# Patient Record
Sex: FEMALE | Race: WHITE | NOT HISPANIC OR LATINO | URBAN - METROPOLITAN AREA
[De-identification: names, ages, dates, MRNs, and addresses within clinical notes are randomized per-mention and may not be internally consistent; named-entity substitution may affect disease eponyms.]

---

## 2017-09-19 ENCOUNTER — OFFICE (OUTPATIENT)
Dept: URBAN - METROPOLITAN AREA CLINIC 45 | Facility: CLINIC | Age: 77
End: 2017-09-19

## 2017-09-19 VITALS
SYSTOLIC BLOOD PRESSURE: 141 MMHG | HEIGHT: 62 IN | TEMPERATURE: 97 F | WEIGHT: 136 LBS | DIASTOLIC BLOOD PRESSURE: 84 MMHG | HEART RATE: 65 BPM

## 2017-09-19 DIAGNOSIS — K21.9 GASTROESOPHAGEAL REFLUX DISEASE: ICD-10-CM

## 2017-09-19 DIAGNOSIS — K51.50 LEFT SIDED COLITIS W/O COMPLICATIONS: ICD-10-CM

## 2017-09-19 DIAGNOSIS — Z79.01 LONG-TERM (CURRENT) USE OF ANTICOAGULANTS: ICD-10-CM

## 2017-09-19 DIAGNOSIS — M35.3 POLYMYALGIA RHEUMATICA: ICD-10-CM

## 2017-09-19 PROCEDURE — 99214 OFFICE O/P EST MOD 30 MIN: CPT

## 2017-09-19 NOTE — SERVICEHPINOTES
SEGUN WELCH returns today. This flare up of colitis started 29 years > The patient is having 1 - 3 bowel movements per day. The patient is has not been having severe cramps and not and frequent loose bloody stools. Body temperature has been has not been greater than 37.5 degrees C.The patient has an established diagnosis of ulcerative proctosigmoiditis. Diagnosis was made greater than 15 years ago. Disease course has been stable on aminosalicylates, 6-MP, prednisone. Current treatment regimen consists of Lialda. Symptoms are remained in remission compared to when the patient was last seen for follow-up. Active GI symptoms include loose stool. They are considered by the patient to be minimal in nature. The patient also reports the following potential extraintestinal symptom manifestations: joint pain. The patient has been doing well overall. currently taking 2 mg of prednisone and, 25 mg of mercaptopurine and 3 Lialda daily and remaining in complete remission except for joint pains and some muscle weakness. Would like to take anti-inflammatory medications for the arthritis but she was made aware of possible risk exacerbation of her IBD. Feels puffy and swollen and blood pressure has been elevated. Has ringing in her ears. Takes Lialda 3 /d and 25 mg of 6-MP. Stopped taking the Lialda due to the excessive cost . Also weaned herself off the prednisone and now has severe weakness of the muscles particularly  and find it hard to stand and raise her arms. Recent blood work shows mild anemia and elevated sedimentation rate suggesting the possibility of polymyalgia. Denies any colitis symptoms at the present time . Ground glass infiltrates being followed up at [facility]. Currently on 8mg prednisone, lowered from 10mg. Worse GERD uses Prevacid PRN. Lialda 2 BID   . Resumed Lialda on 9/1/17. Constipation resolved.

## 2017-12-26 ENCOUNTER — OFFICE (OUTPATIENT)
Dept: URBAN - METROPOLITAN AREA CLINIC 45 | Facility: CLINIC | Age: 77
End: 2017-12-26

## 2017-12-26 VITALS
WEIGHT: 130 LBS | HEIGHT: 62 IN | TEMPERATURE: 97.6 F | DIASTOLIC BLOOD PRESSURE: 62 MMHG | SYSTOLIC BLOOD PRESSURE: 142 MMHG | HEART RATE: 59 BPM

## 2017-12-26 DIAGNOSIS — K51.50 LEFT SIDED COLITIS W/O COMPLICATIONS: ICD-10-CM

## 2017-12-26 DIAGNOSIS — K21.9 GASTROESOPHAGEAL REFLUX DISEASE: ICD-10-CM

## 2017-12-26 DIAGNOSIS — Z79.01 LONG-TERM (CURRENT) USE OF ANTICOAGULANTS: ICD-10-CM

## 2017-12-26 DIAGNOSIS — I25.10 CORONARY ARTERY DISEASE: ICD-10-CM

## 2017-12-26 PROCEDURE — 99214 OFFICE O/P EST MOD 30 MIN: CPT | Performed by: INTERNAL MEDICINE

## 2017-12-26 RX ORDER — PANTOPRAZOLE SODIUM 20 MG/1
TABLET, DELAYED RELEASE ORAL
Qty: 30 | Status: COMPLETED
Start: 2017-12-26 | End: 2019-04-11

## 2017-12-26 NOTE — SERVICEHPINOTES
Recent constipation has resolved.  We have followed the patient for   left-sided ulcerative colitis  .  Disease course has been   stable on current medication  .     Currently on treatment with   6-mercaptopurine, Lialda and Prednisone  .    Baseline IBD symptoms have   improved   since last visit.  The patient   has   been doing well overall.   Currently having   1-2   bowel movement(s) per day.    Active GI symptoms include   occ mucus  .    They are considered by the patient to be   mild   in nature.    Alarm symptoms reported:   none  .    The patient also reports the following potential extraintestinal symptom manifestations:   joint pain/muscle pain  .    Symptoms have recently caused the patient to   .   SP stent placement x2 past year now on Plavix.Follow-up of GERD was discussed.   The patient has typically complained of   heartburn  .      Treatment has consisted of   Prevacid OTC  taken at   once daily  .   This therapy has been associated with   good   relief.   The patient   has not   been having breakthru GERD symptoms.  Symptoms do   not awaken the patient from sleep  .    Has been treating residual symptoms with   .   Continuing symptoms may be brought on by   nothing specific  .    No dysphagia,n/v.

## 2018-03-12 ENCOUNTER — HOSPITAL ENCOUNTER (OUTPATIENT)
Dept: HOSPITAL 54 - RAD | Age: 78
Discharge: HOME | End: 2018-03-12
Payer: MEDICARE

## 2018-03-12 DIAGNOSIS — K44.9: Primary | ICD-10-CM

## 2019-04-11 ENCOUNTER — OFFICE (OUTPATIENT)
Dept: URBAN - METROPOLITAN AREA CLINIC 66 | Facility: CLINIC | Age: 79
End: 2019-04-11

## 2019-04-11 VITALS — DIASTOLIC BLOOD PRESSURE: 74 MMHG | WEIGHT: 140 LBS | HEIGHT: 62 IN | SYSTOLIC BLOOD PRESSURE: 122 MMHG

## 2019-04-11 DIAGNOSIS — K21.9 GASTROESOPHAGEAL REFLUX DISEASE: ICD-10-CM

## 2019-04-11 DIAGNOSIS — K51.90 ULCERATIVE COLITIS: ICD-10-CM

## 2019-04-11 PROCEDURE — 99244 OFF/OP CNSLTJ NEW/EST MOD 40: CPT | Performed by: INTERNAL MEDICINE

## 2019-04-11 NOTE — SERVICEHPINOTES
The patient is a pleasant 80 yo female referred for evaluation of GERD and ulcerative colitis.  The patient has been on Nexium for quite some time, but has been on this after dinner, rather than before.  She'll make the change, and we'll see if this improves things.  Her 2015 EGD found a moderate sized HH, no Hp nor celiac.   She's on Lialda 4.8 grams a day, and her 2007 colonoscopy found left sided mildly active disease, without any footprints of quiescent colitis on the right side.  She continues to refuse repeat colonoscopies, and would like to have a virtual colonoscopy.

## 2019-12-03 ENCOUNTER — HOSPITAL ENCOUNTER (INPATIENT)
Dept: HOSPITAL 54 - ER | Age: 79
LOS: 7 days | Discharge: NURSING, REHAB OR LONG-TERM CARE | DRG: 480 | End: 2019-12-10
Attending: INTERNAL MEDICINE | Admitting: FAMILY MEDICINE
Payer: MEDICARE

## 2019-12-03 VITALS — WEIGHT: 149 LBS | HEIGHT: 62 IN | BODY MASS INDEX: 27.42 KG/M2

## 2019-12-03 VITALS — DIASTOLIC BLOOD PRESSURE: 56 MMHG | SYSTOLIC BLOOD PRESSURE: 140 MMHG

## 2019-12-03 VITALS — SYSTOLIC BLOOD PRESSURE: 141 MMHG | DIASTOLIC BLOOD PRESSURE: 72 MMHG

## 2019-12-03 DIAGNOSIS — Z79.01: ICD-10-CM

## 2019-12-03 DIAGNOSIS — J98.11: ICD-10-CM

## 2019-12-03 DIAGNOSIS — K59.00: ICD-10-CM

## 2019-12-03 DIAGNOSIS — D63.8: ICD-10-CM

## 2019-12-03 DIAGNOSIS — T39.395A: ICD-10-CM

## 2019-12-03 DIAGNOSIS — Z87.891: ICD-10-CM

## 2019-12-03 DIAGNOSIS — E86.9: ICD-10-CM
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
## 2019-12-03 DIAGNOSIS — I25.10: ICD-10-CM

## 2019-12-03 DIAGNOSIS — Z85.118: ICD-10-CM

## 2019-12-03 DIAGNOSIS — Z79.899: ICD-10-CM

## 2019-12-03 DIAGNOSIS — D72.829: ICD-10-CM

## 2019-12-03 DIAGNOSIS — Z79.890: ICD-10-CM

## 2019-12-03 DIAGNOSIS — I10: ICD-10-CM

## 2019-12-03 DIAGNOSIS — S72.012A: Primary | ICD-10-CM

## 2019-12-03 DIAGNOSIS — Z79.82: ICD-10-CM

## 2019-12-03 DIAGNOSIS — Z79.02: ICD-10-CM

## 2019-12-03 DIAGNOSIS — N17.0: ICD-10-CM

## 2019-12-03 DIAGNOSIS — K51.90: ICD-10-CM

## 2019-12-03 DIAGNOSIS — Z95.5: ICD-10-CM

## 2019-12-03 DIAGNOSIS — Z85.3: ICD-10-CM
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
## 2019-12-03 DIAGNOSIS — Z82.49: ICD-10-CM

## 2019-12-03 LAB
BASOPHILS # BLD AUTO: 0.1 /CMM (ref 0–0.2)
BASOPHILS NFR BLD AUTO: 0.5 % (ref 0–2)
BUN SERPL-MCNC: 27 MG/DL (ref 7–18)
CALCIUM SERPL-MCNC: 9.3 MG/DL (ref 8.5–10.1)
CHLORIDE SERPL-SCNC: 109 MMOL/L (ref 98–107)
CO2 SERPL-SCNC: 28 MMOL/L (ref 21–32)
CREAT SERPL-MCNC: 1.1 MG/DL (ref 0.6–1.3)
EOSINOPHIL NFR BLD AUTO: 0.1 % (ref 0–6)
GLUCOSE SERPL-MCNC: 92 MG/DL (ref 74–106)
HCT VFR BLD AUTO: 28 % (ref 33–45)
HGB BLD-MCNC: 8.8 G/DL (ref 11.5–14.8)
LYMPHOCYTES NFR BLD AUTO: 0.9 /CMM (ref 0.8–4.8)
LYMPHOCYTES NFR BLD AUTO: 6.9 % (ref 20–44)
MCHC RBC AUTO-ENTMCNC: 31 G/DL (ref 31–36)
MCV RBC AUTO: 80 FL (ref 82–100)
MONOCYTES NFR BLD AUTO: 1 /CMM (ref 0.1–1.3)
MONOCYTES NFR BLD AUTO: 7.3 % (ref 2–12)
NEUTROPHILS # BLD AUTO: 11.7 /CMM (ref 1.8–8.9)
NEUTROPHILS NFR BLD AUTO: 85.2 % (ref 43–81)
PLATELET # BLD AUTO: 290 /CMM (ref 150–450)
POTASSIUM SERPL-SCNC: 3.6 MMOL/L (ref 3.5–5.1)
RBC # BLD AUTO: 3.48 MIL/UL (ref 4–5.2)
SODIUM SERPL-SCNC: 145 MMOL/L (ref 136–145)
WBC NRBC COR # BLD AUTO: 13.8 K/UL (ref 4.3–11)

## 2019-12-03 PROCEDURE — G0378 HOSPITAL OBSERVATION PER HR: HCPCS

## 2019-12-03 PROCEDURE — C1713 ANCHOR/SCREW BN/BN,TIS/BN: HCPCS

## 2019-12-03 PROCEDURE — A6209 FOAM DRSG <=16 SQ IN W/O BDR: HCPCS

## 2019-12-03 RX ADMIN — SODIUM CHLORIDE PRN MLS/HR: 9 INJECTION, SOLUTION INTRAVENOUS at 21:54

## 2019-12-03 RX ADMIN — Medication PRN MG: at 17:02

## 2019-12-03 RX ADMIN — DEXTROSE MONOHYDRATE SCH MLS/HR: 50 INJECTION, SOLUTION INTRAVENOUS at 21:54

## 2019-12-03 NOTE — NUR
MS/RN NOTES

PATIETN DAUGHTER LILIA CALLED AND ASKED ABOUT STATUS OF PENDING PROCEDUREM TEL NO 
[phone] THAT WILL FOLLOW UP IN AM AND WILL INFORM OWN PCP OF OATIENT DR. JONH SWANSON 
[phone].

## 2019-12-03 NOTE — NUR
MS/RN NOTES

PATIENT REQUEST TO BE TAKEN OUT FOR SMOKE RO[facility] AM, DISCUSSED AND EDUCATED ON DISEASE PROCESS 
AND EXPLAINED THE RISK PATIENT VERBALIZED.

-------------------------------------------------------------------------------

Addendum: 12/03/19 at 2018 by ANGELICA JEONG RN

-------------------------------------------------------------------------------

DISREGARD FOR DIFFERENT PATIENT

## 2019-12-03 NOTE — NUR
,S/RN NOTES

PATIENT WITH ELEVATED TEMPERATURE .4 AFTER COOLING MEASURES PROVIDED, MD MADE AWARE 
AND ORDERED STAT BLOOD CULTURE 2X, ALSO PATIENT WITH NO ANTIBIOTIC AT THIS TIME.LAB MADE 
AWARE, AND ORDER CARRIED OUT.

## 2019-12-03 NOTE — NUR
MS RN NOTES



PLACED CALL TO [facility] ([phone]) AND SPOKE WITH ELYSSA PHARMACIST, OBTAINED LIST 
OF MEDICATIONS PATIENT IS TAKING. DR VÁZQUEZ MADE AWARE. WITH ORDERS TO CONTINUE 
MEDICATIONS EXCEPT METOPROLOL, ASA, PLAVIX. PATIENT MADE AWARE. WILL CONTINUE TO MONITOR

## 2019-12-03 NOTE — NUR
MS/RN OPENING NOTES

RECEIVED PATIENT IN BED, ABLE TO RESPOND WITH NOD, RESPIRATIONS EVEN AND UNLABORED, SKIN 
WARM TO TOUCH. LEFT LEG WITH BRUISE AND REDNESS ELEVATED  WITH PILLOW. RIGHT AC GAUGE 20 
PATENT WITH IV FLUID BEING INFUSEDM RECEIVED REPORT FROM AM RN FOR STEVENSON, POLST TO BE SIGNED 
BY ENA DEMPSEY, AND TO FOLLOW UP WITH SURGEON COLIN FOR POSSIBLE SX. WILL MONITOR,

## 2019-12-03 NOTE — NUR
MS RN NOTES



PATIENT ARRIVED AT UNIT, REPORT RECEIVED FROM ANDREW DAVALOS. PATIENT AWAKE, A/OX 4. NO ACUTE 
DISTRESS. VERBALIZES DISCOMFORT ON LEFT HIP BUT MANAGEABLE AS LONG AS LEFT HIP IS NOT 
MOVED/TOUCHED. PATIENT ORIENTED TO UNIT, STAFF, PLAN OF CARE AND VERBALIZED UNDERSTANDING. 
PATIENT UNDER MEDICAL SUPERVISION OF DR VÁZQUEZ, AWARE OF PATIENT ARRIVAL. WILL CONTINUE TO 
MONITOR. BED LOCKED AND IN LOW POSITION. BILATERAL UPPER SIDE RAILS UP AND LOCKED. CALL 
LIGHT WITHIN EASY REACH

## 2019-12-03 NOTE — NUR
MS RN NOTES



PATIENT RESTING INSIDE ROOM. AWAKE, A/O X 4. NO ACUTE DISTRESS. PATIENT KEPT CLEAN, DRY AND 
COMFORTABLE. SAFETY PRECAUTIONS IN PLACE. WILL ENDORSE TO INCOMING SHIFT FOR STEVENSON. BED LOCKED 
AND IN LOW POSITION. BILATERAL UPPER SIDE RAILS UP AND LOCKED. CALL LIGHT WITHIN EASY REACH

## 2019-12-03 NOTE — NUR
MS RN NOTES



PATIENT SEEN AND EXAMINED BY DMITRY VALERIO. VERBALIZED THAT NO SURGICAL PROCEDURE TO BE 
DONE TODAY, PATIENT MAY HAVE PO DIET. PATIENT MADE AWARE AND VERBALIZED UNDERSTANDING. 
PATIENT FOR CT OF LEFT HIP.

## 2019-12-03 NOTE — NUR
[id number], from home, c/o left hip pain 7/10 PS s/p GLF, got up in bed and felt 
dizzy, hit L side of the head on the floor, -ko, fentanyl 100mcg. Patient 
a/ox3, breathing even and unlabored, no sob noted, attached to the cardiac 
monitor. Needs attended.

## 2019-12-04 VITALS — SYSTOLIC BLOOD PRESSURE: 119 MMHG | DIASTOLIC BLOOD PRESSURE: 57 MMHG

## 2019-12-04 VITALS — SYSTOLIC BLOOD PRESSURE: 153 MMHG | DIASTOLIC BLOOD PRESSURE: 66 MMHG

## 2019-12-04 VITALS — DIASTOLIC BLOOD PRESSURE: 59 MMHG | SYSTOLIC BLOOD PRESSURE: 122 MMHG

## 2019-12-04 VITALS — DIASTOLIC BLOOD PRESSURE: 70 MMHG | SYSTOLIC BLOOD PRESSURE: 139 MMHG

## 2019-12-04 VITALS — SYSTOLIC BLOOD PRESSURE: 47 MMHG

## 2019-12-04 LAB
APPEARANCE UR: CLEAR
BASOPHILS # BLD AUTO: 0 /CMM (ref 0–0.2)
BASOPHILS NFR BLD AUTO: 0.2 % (ref 0–2)
BILIRUB UR QL STRIP: NEGATIVE
BUN SERPL-MCNC: 21 MG/DL (ref 7–18)
CALCIUM SERPL-MCNC: 8.6 MG/DL (ref 8.5–10.1)
CHLORIDE SERPL-SCNC: 108 MMOL/L (ref 98–107)
CHOLEST SERPL-MCNC: 141 MG/DL (ref ?–200)
CO2 SERPL-SCNC: 25 MMOL/L (ref 21–32)
COLOR UR: YELLOW
CREAT SERPL-MCNC: 1.2 MG/DL (ref 0.6–1.3)
EOSINOPHIL NFR BLD AUTO: 0.5 % (ref 0–6)
FERRITIN SERPL-MCNC: 49 NG/ML (ref 8–388)
GLUCOSE SERPL-MCNC: 99 MG/DL (ref 74–106)
GLUCOSE UR STRIP-MCNC: NEGATIVE MG/DL
HCT VFR BLD AUTO: 27 % (ref 33–45)
HDLC SERPL-MCNC: 68 MG/DL (ref 40–60)
HGB BLD-MCNC: 8.7 G/DL (ref 11.5–14.8)
HGB UR QL STRIP: NEGATIVE ERY/UL
IRON SERPL-MCNC: 12 UG/DL (ref 50–175)
KETONES UR STRIP-MCNC: NEGATIVE MG/DL
LDLC SERPL DIRECT ASSAY-MCNC: 56 MG/DL (ref 0–99)
LEUKOCYTE ESTERASE UR QL STRIP: NEGATIVE
LYMPHOCYTES NFR BLD AUTO: 0.5 /CMM (ref 0.8–4.8)
LYMPHOCYTES NFR BLD AUTO: 4.8 % (ref 20–44)
MAGNESIUM SERPL-MCNC: 2 MG/DL (ref 1.8–2.4)
MCHC RBC AUTO-ENTMCNC: 32 G/DL (ref 31–36)
MCV RBC AUTO: 80 FL (ref 82–100)
MONOCYTES NFR BLD AUTO: 0.5 /CMM (ref 0.1–1.3)
MONOCYTES NFR BLD AUTO: 5.4 % (ref 2–12)
NEUTROPHILS # BLD AUTO: 8.9 /CMM (ref 1.8–8.9)
NEUTROPHILS NFR BLD AUTO: 89.1 % (ref 43–81)
NITRITE UR QL STRIP: NEGATIVE
PH UR STRIP: 6 [PH] (ref 5–8)
PHOSPHATE SERPL-MCNC: 4 MG/DL (ref 2.5–4.9)
PLATELET # BLD AUTO: 243 /CMM (ref 150–450)
POTASSIUM SERPL-SCNC: 4.1 MMOL/L (ref 3.5–5.1)
PROT UR QL STRIP: (no result) MG/DL
RBC # BLD AUTO: 3.38 MIL/UL (ref 4–5.2)
RBC #/AREA URNS HPF: (no result) /HPF (ref 0–2)
SODIUM SERPL-SCNC: 145 MMOL/L (ref 136–145)
TIBC SERPL-MCNC: 285 UG/DL (ref 250–450)
TRIGL SERPL-MCNC: 77 MG/DL (ref 30–150)
TSH SERPL DL<=0.005 MIU/L-ACNC: 0.01 UIU/ML (ref 0.36–3.74)
UROBILINOGEN UR STRIP-MCNC: 0.2 EU/DL
WBC #/AREA URNS HPF: (no result) /HPF (ref 0–3)
WBC NRBC COR # BLD AUTO: 10 K/UL (ref 4.3–11)

## 2019-12-04 RX ADMIN — LEVOTHYROXINE SODIUM SCH MCG: 100 TABLET ORAL at 07:30

## 2019-12-04 RX ADMIN — ACETAMINOPHEN PRN MG: 325 TABLET ORAL at 00:01

## 2019-12-04 RX ADMIN — DEXTROSE MONOHYDRATE PRN MG: 50 INJECTION, SOLUTION INTRAVENOUS at 08:24

## 2019-12-04 RX ADMIN — Medication PRN MG: at 17:24

## 2019-12-04 RX ADMIN — ATORVASTATIN CALCIUM SCH MG: 10 TABLET, FILM COATED ORAL at 08:25

## 2019-12-04 RX ADMIN — Medication PRN MG: at 12:52

## 2019-12-04 RX ADMIN — DEXTROSE MONOHYDRATE SCH MLS/HR: 50 INJECTION, SOLUTION INTRAVENOUS at 20:44

## 2019-12-04 RX ADMIN — ATORVASTATIN CALCIUM SCH MG: 10 TABLET, FILM COATED ORAL at 08:37

## 2019-12-04 RX ADMIN — LEVOTHYROXINE SODIUM SCH MCG: 100 TABLET ORAL at 08:25

## 2019-12-04 RX ADMIN — Medication PRN MG: at 08:23

## 2019-12-04 RX ADMIN — DEXTROSE MONOHYDRATE PRN MG: 50 INJECTION, SOLUTION INTRAVENOUS at 15:13

## 2019-12-04 RX ADMIN — ACETAMINOPHEN PRN MG: 325 TABLET ORAL at 18:31

## 2019-12-04 NOTE — NUR
MS/RN Closing note 



Patient is resting in bed, A/O x4, showing no signs of acute distress or SOB, saturating 
>95% on RA. IV line in the RAC #20 s/l is clean and patent. All patient needs met, all due 
meds given. Patient is to be NPO after midnight as per Daphne ANDRES. 

Bed is in lowest position, side rials x2 in upright position, call light is within reach and 
patient is aware of how to call for assistance when needed. Will endorse to night shift.

## 2019-12-04 NOTE — NUR
MS/RN NOTE 



notified Daphne Arce patient is on Clopidogrel 75mg daily. Last dose taken was Monday 12/2/19.

## 2019-12-04 NOTE — NUR
MS RN OPENING NOTES



RECEIVED PATIENT IN BED, ALERT, ORIENTED X 3. BREATHING EVEN AND UNLABORED. NOT IN ANY 
DISTRESS. ON ROOM AIR. NO COMPLAINTS OF PAIN OR DISCOMFORT AT THIS TIME. IV LINE ON RAC 
NOTED TO BE LEAKING. NEW IV LINE ON R) UPPER ARM INSERTED WITH GOOD BLOOD RETURN. SAFETY 
MEASURES IN PLACE; CALL LIGHT WITHIN REACH, BED IN LOW, LOCKED POSITION. WILL CONTINUE TO 
MONITOR ACCORDINGLY

## 2019-12-05 VITALS — SYSTOLIC BLOOD PRESSURE: 117 MMHG | DIASTOLIC BLOOD PRESSURE: 72 MMHG

## 2019-12-05 VITALS — SYSTOLIC BLOOD PRESSURE: 131 MMHG | DIASTOLIC BLOOD PRESSURE: 86 MMHG

## 2019-12-05 VITALS — SYSTOLIC BLOOD PRESSURE: 140 MMHG | DIASTOLIC BLOOD PRESSURE: 60 MMHG

## 2019-12-05 VITALS — SYSTOLIC BLOOD PRESSURE: 120 MMHG | DIASTOLIC BLOOD PRESSURE: 77 MMHG

## 2019-12-05 VITALS — DIASTOLIC BLOOD PRESSURE: 76 MMHG | SYSTOLIC BLOOD PRESSURE: 159 MMHG

## 2019-12-05 VITALS — DIASTOLIC BLOOD PRESSURE: 80 MMHG | SYSTOLIC BLOOD PRESSURE: 166 MMHG

## 2019-12-05 VITALS — DIASTOLIC BLOOD PRESSURE: 84 MMHG | SYSTOLIC BLOOD PRESSURE: 172 MMHG

## 2019-12-05 VITALS — SYSTOLIC BLOOD PRESSURE: 177 MMHG | DIASTOLIC BLOOD PRESSURE: 85 MMHG

## 2019-12-05 VITALS — SYSTOLIC BLOOD PRESSURE: 145 MMHG | DIASTOLIC BLOOD PRESSURE: 75 MMHG

## 2019-12-05 VITALS — SYSTOLIC BLOOD PRESSURE: 144 MMHG | DIASTOLIC BLOOD PRESSURE: 71 MMHG

## 2019-12-05 VITALS — DIASTOLIC BLOOD PRESSURE: 79 MMHG | SYSTOLIC BLOOD PRESSURE: 128 MMHG

## 2019-12-05 LAB
BASOPHILS # BLD AUTO: 0 /CMM (ref 0–0.2)
BASOPHILS # BLD AUTO: 0 /CMM (ref 0–0.2)
BASOPHILS NFR BLD AUTO: 0.1 % (ref 0–2)
BASOPHILS NFR BLD AUTO: 0.2 % (ref 0–2)
BUN SERPL-MCNC: 28 MG/DL (ref 7–18)
CALCIUM SERPL-MCNC: 8.5 MG/DL (ref 8.5–10.1)
CHLORIDE SERPL-SCNC: 109 MMOL/L (ref 98–107)
CO2 SERPL-SCNC: 25 MMOL/L (ref 21–32)
CREAT SERPL-MCNC: 1.4 MG/DL (ref 0.6–1.3)
EOSINOPHIL NFR BLD AUTO: 2.6 % (ref 0–6)
EOSINOPHIL NFR BLD AUTO: 2.7 % (ref 0–6)
GLUCOSE SERPL-MCNC: 93 MG/DL (ref 74–106)
HCT VFR BLD AUTO: 25 % (ref 33–45)
HCT VFR BLD AUTO: 26 % (ref 33–45)
HGB BLD-MCNC: 8 G/DL (ref 11.5–14.8)
HGB BLD-MCNC: 8.1 G/DL (ref 11.5–14.8)
LYMPHOCYTES NFR BLD AUTO: 0.5 /CMM (ref 0.8–4.8)
LYMPHOCYTES NFR BLD AUTO: 1.1 /CMM (ref 0.8–4.8)
LYMPHOCYTES NFR BLD AUTO: 10.6 % (ref 20–44)
LYMPHOCYTES NFR BLD AUTO: 4.7 % (ref 20–44)
MCHC RBC AUTO-ENTMCNC: 32 G/DL (ref 31–36)
MCHC RBC AUTO-ENTMCNC: 32 G/DL (ref 31–36)
MCV RBC AUTO: 80 FL (ref 82–100)
MCV RBC AUTO: 81 FL (ref 82–100)
MONOCYTES NFR BLD AUTO: 0.6 /CMM (ref 0.1–1.3)
MONOCYTES NFR BLD AUTO: 0.9 /CMM (ref 0.1–1.3)
MONOCYTES NFR BLD AUTO: 5.6 % (ref 2–12)
MONOCYTES NFR BLD AUTO: 8.1 % (ref 2–12)
NEUTROPHILS # BLD AUTO: 8.2 /CMM (ref 1.8–8.9)
NEUTROPHILS # BLD AUTO: 9 /CMM (ref 1.8–8.9)
NEUTROPHILS NFR BLD AUTO: 78.5 % (ref 43–81)
NEUTROPHILS NFR BLD AUTO: 86.9 % (ref 43–81)
PLATELET # BLD AUTO: 224 /CMM (ref 150–450)
PLATELET # BLD AUTO: 230 /CMM (ref 150–450)
POTASSIUM SERPL-SCNC: 3.7 MMOL/L (ref 3.5–5.1)
RBC # BLD AUTO: 3.14 MIL/UL (ref 4–5.2)
RBC # BLD AUTO: 3.16 MIL/UL (ref 4–5.2)
SODIUM SERPL-SCNC: 141 MMOL/L (ref 136–145)
WBC NRBC COR # BLD AUTO: 10.3 K/UL (ref 4.3–11)
WBC NRBC COR # BLD AUTO: 10.5 K/UL (ref 4.3–11)

## 2019-12-05 PROCEDURE — 30233N1 TRANSFUSION OF NONAUTOLOGOUS RED BLOOD CELLS INTO PERIPHERAL VEIN, PERCUTANEOUS APPROACH: ICD-10-PCS | Performed by: SPECIALIST

## 2019-12-05 PROCEDURE — 0QS704Z REPOSITION LEFT UPPER FEMUR WITH INTERNAL FIXATION DEVICE, OPEN APPROACH: ICD-10-PCS | Performed by: SPECIALIST

## 2019-12-05 RX ADMIN — DEXTROSE MONOHYDRATE PRN MG: 50 INJECTION, SOLUTION INTRAVENOUS at 13:40

## 2019-12-05 RX ADMIN — Medication PRN MG: at 16:21

## 2019-12-05 RX ADMIN — Medication PRN EACH: at 20:37

## 2019-12-05 RX ADMIN — SODIUM CHLORIDE, SODIUM LACTATE, POTASSIUM CHLORIDE, AND CALCIUM CHLORIDE PRN MLS/HR: .6; .31; .03; .02 INJECTION, SOLUTION INTRAVENOUS at 14:35

## 2019-12-05 RX ADMIN — ATORVASTATIN CALCIUM SCH MG: 10 TABLET, FILM COATED ORAL at 08:45

## 2019-12-05 RX ADMIN — CARBOXYMETHYLCELLULOSE SODIUM PRN ML: 5 SOLUTION/ DROPS OPHTHALMIC at 18:18

## 2019-12-05 RX ADMIN — LEVOTHYROXINE SODIUM SCH MCG: 100 TABLET ORAL at 07:30

## 2019-12-05 RX ADMIN — SODIUM CHLORIDE PRN MLS/HR: 9 INJECTION, SOLUTION INTRAVENOUS at 00:39

## 2019-12-05 RX ADMIN — Medication PRN MG: at 10:13

## 2019-12-05 RX ADMIN — SODIUM CHLORIDE SCH MLS/HR: 9 INJECTION, SOLUTION INTRAVENOUS at 16:02

## 2019-12-05 RX ADMIN — ENOXAPARIN SODIUM SCH MG: 40 INJECTION SUBCUTANEOUS at 22:04

## 2019-12-05 RX ADMIN — DEXTROSE MONOHYDRATE SCH MLS/HR: 50 INJECTION, SOLUTION INTRAVENOUS at 22:03

## 2019-12-05 NOTE — NUR
MS RN CLOSING NOTES



PATIENT RESTING IN BED, ALERT, ORIENTED X 3. BREATHING EVEN AND UNLABORED. NOT IN ANY 
DISTRESS. NO COMPLAINTS AT THIS TIME. PERIPHERAL IV INFUSING AT 100ML/HR. KEPT CLEAN, DRY 
AND COMFORTABLE. NO ACUTE CHANGES OVERNIGHT. SAFETY MEASURES IN PLACE; CALL LIGHT WITHIN 
REACH. BED IN LOW, LOCKED POSITION. WILL ENDORSE STEVENSON TO ONCOMING RN

## 2019-12-05 NOTE — NUR
MS/RN  NOTE



RECEIVED NEW ORDER FROM DR CHAMPAGNE FOR 1 UNIT O PRBC FOR HGB LESS THAN 7. THE ORDER IS 
READ BACK, VERIFIED. NOTED AND CARRIED OUT.

## 2019-12-05 NOTE — NUR
MS/RN  NOTE



THE PATIENT IS RECEIVED IN BED. PATIENT IS ALERT AND ORIENTED X4. IN ROOM AIR AND DENIES 
SOB. RESPIRATION REGULAR AND UNLABORED. COMPLAINS OF LEFT HIP PAIN 3/10 AT THIS TIME. 
OFFERED PAIN MEDICATION BUT THE PATIENT REFUSES PAIN MEDICATION AT THIS TIME. PATIENT NPO 
SINCE MIDNIGHT. JHONNY G 20 PATENT AND NS INFUSING AT 100ML/HR AND NO S/S INFILTRATION NOTED. 
BED LOW AND LOCKED. SIDE RAILS UP X3. CALL LIGHT WITHIN REACH. WILL CONTINUE TO MONITOR.

## 2019-12-05 NOTE — NUR
MS/RN  NOTE 



PATIENT COMPLAINS OF LEFT EYE PAIN 2/10 AND REQUESTING ARTIFICIAL EYE DROPS. NO APPARENT S/S 
STROKE NOTED.

## 2019-12-05 NOTE — NUR
EARNESTINE HOSPITALIST PT C/O REQUESTING ORTEGA CATH TO BE INSERTED PER HOSPITALIST CALLED ORTHO.



CALLED ORTHO  SPOKE TO DR. SHELTON PAREDES WITH NEW ORDERS TO INSERT ORTEGA CATHETER 
READ BACK AND VERIFIED ORDERS NOTED AND CARRIED OUT

## 2019-12-05 NOTE — NUR
MS/RN  NOTE



PHARMACY VERIFIED APRESOLINE 25 MG PO. ADMINISTERED THE MEDICATION FOR BLOOD PRESSURE OF 
166/80, PULSE 80. WILL CONTINUE TO MONITOR.

## 2019-12-05 NOTE — NUR
MS RN



RECEIVE PT IN BED A/O X 3, S/P ORIF L HIP FX, L HIP WITH SURGICAL DRESSING INTACT NO S/S OF 
BLEEDING NOTED. STABLE AND NOT IN DISTRESS, SAFETY MEASURES AT ALL TIMES. WILL CONT TO 
MONITOR

## 2019-12-05 NOTE — NUR
MS/RN  NOTE



THE PATIENT IS ALERT AND ORIENTED X3. IN ROOM AIR AND SATURATION IS AT 92%. DENIES SOB. 
PATIENT COMPLAINS OF LEFTHIP PAIN 2/10. DRESSING INTACT WITH NO BLEEDING. NO APPARENT S/S 
INFECTION NOTE AROUND DRESSING. THE PATIENT TOLERATED REGULAR DIET WELL. RIGHT WRIST G 22 
PATENT AND IV FLUID INFUSING PER ORDER. NO S/S INFILTRATION NOTED. BED LOW AND LOCKED. SIDE 
RAILS UP X3. CALL LIGHT WITHIN REACH. WILL ENDORSE TO NIGHT SHIFT.

## 2019-12-05 NOTE — NUR
MS/RN  NOTE



RECEIVED THE PATIENT FROM OR. THE PATIENT IS ALERT AND ORIENTED X4. RECEIVING OXYGEN AT 
2L/MIN VIA NASAL CANNULA AND DENIES SOB. RESPIRATION REGULAR AND UNLABORED. PATIENT 
COMPLAINS OF LEFT HIP AND LEG PAIN 5/10. DOES NOT WANT PAIN MEDICATION AT THIS TIME. PEDAL 
PULSES PRESENT. NO APPARENT S/S POOR CIRCULATION NOTED. APPLIED SCD SLEEVES ON BOTH LOWER 
LEGS. BED LOW AND LOCKED. SIDE RAILS UP X3. CALL LIGHT WITHIN REACH. WILL CONTINUE TO 
MONITOR.

## 2019-12-05 NOTE — NUR
MS/RN  NOTE



RECEIVED CALL FROM DR VÁZQUEZ, MADE HIM AWARE OF PATIENT`S BLOOD PRESSURE READINGS AND 
PATIENT COMPLAINING OF LEFT EYE PAIN. RECEIVED ORDERS OF APRESOLINE 25 MG PO ONE TIME ORDER 
AND ARTIFICIAL TEARS DROP 1 DROP IN EACH EYES Q8HR PRN. READ BACK, VERIFIED. NOTED AND 
CARRIED OUT.

## 2019-12-05 NOTE — NUR
MS/RN   NOTE



THE PATIENT GOT PICKED UP IN BED GOING TO A SURGERY. THE PATIENT ALERT AND ORIENTED X4. 
DENIES SOB. IN ROOM AIR AND SATURATION IS AT 98%. DENIES PAIN AT THIS TIME. THE PATIENT IN 
STABLE CONDITION.

## 2019-12-06 VITALS — SYSTOLIC BLOOD PRESSURE: 176 MMHG | DIASTOLIC BLOOD PRESSURE: 74 MMHG

## 2019-12-06 VITALS — DIASTOLIC BLOOD PRESSURE: 83 MMHG | SYSTOLIC BLOOD PRESSURE: 164 MMHG

## 2019-12-06 VITALS — DIASTOLIC BLOOD PRESSURE: 68 MMHG | SYSTOLIC BLOOD PRESSURE: 180 MMHG

## 2019-12-06 VITALS — SYSTOLIC BLOOD PRESSURE: 167 MMHG | DIASTOLIC BLOOD PRESSURE: 76 MMHG

## 2019-12-06 VITALS — SYSTOLIC BLOOD PRESSURE: 158 MMHG | DIASTOLIC BLOOD PRESSURE: 64 MMHG

## 2019-12-06 VITALS — DIASTOLIC BLOOD PRESSURE: 70 MMHG | SYSTOLIC BLOOD PRESSURE: 170 MMHG

## 2019-12-06 RX ADMIN — ATORVASTATIN CALCIUM SCH MG: 10 TABLET, FILM COATED ORAL at 08:47

## 2019-12-06 RX ADMIN — CARBOXYMETHYLCELLULOSE SODIUM PRN ML: 5 SOLUTION/ DROPS OPHTHALMIC at 08:28

## 2019-12-06 RX ADMIN — Medication SCH MG: at 21:30

## 2019-12-06 RX ADMIN — ENOXAPARIN SODIUM SCH MG: 40 INJECTION SUBCUTANEOUS at 21:34

## 2019-12-06 RX ADMIN — ATORVASTATIN CALCIUM SCH MG: 10 TABLET, FILM COATED ORAL at 08:41

## 2019-12-06 RX ADMIN — Medication PRN MG: at 17:10

## 2019-12-06 RX ADMIN — SODIUM CHLORIDE, SODIUM LACTATE, POTASSIUM CHLORIDE, AND CALCIUM CHLORIDE PRN MLS/HR: .6; .31; .03; .02 INJECTION, SOLUTION INTRAVENOUS at 03:41

## 2019-12-06 RX ADMIN — SODIUM CHLORIDE, SODIUM LACTATE, POTASSIUM CHLORIDE, AND CALCIUM CHLORIDE PRN MLS/HR: .6; .31; .03; .02 INJECTION, SOLUTION INTRAVENOUS at 23:21

## 2019-12-06 RX ADMIN — SODIUM CHLORIDE SCH MLS/HR: 9 INJECTION, SOLUTION INTRAVENOUS at 12:40

## 2019-12-06 NOTE — NUR
MS/RN note 



Patient remains stable, no complaints of pain at this time. /64, O2 98% on 2L NC, temp 
98.4 F.

## 2019-12-06 NOTE — NUR
MS/RN note



Patient complained of chest pain radiating to back, notified Dr. Marquez, he assessed 
patient and ordered state EKG, troponin, and 0.4mg nitro SL. 

-------------------------------------------------------------------------------

Addendum: 12/06/19 at 0954 by YOEL ALMEIDA RN

-------------------------------------------------------------------------------

When Dr. Marquez entered room, patient stated her chest pain has gotten better, prior to 
any intervention.

## 2019-12-06 NOTE — NUR
MS/RN Closing note 



Patient is resting in bed, saturating >95% on 2L NC. Showing no signs of distress at this 
time. IV line in the RFA #22 is running NS @ 75ml/hour. Left hip surgical incision is clean 
and dry with no signs of bleeding. All patient needs met, all due meds given. 

Bed is in lowest position, side rails x3 in upright position, safety and fall precautions 
enforced. Call light is within reach and patient is aware of how to call for assistance when 
needed. Will endorse to night shift. 

-------------------------------------------------------------------------------

Addendum: 12/06/19 at 1923 by YOEL ALMEIDA RN

-------------------------------------------------------------------------------

IV running LR at 75ml/hour

## 2019-12-06 NOTE — NUR
MS/RN elevated BP 



Notified Dr. Marquez of patient's /89, [place] 84. Manual /70. Patient stated she 
became anxious after family visited her, family constantly calling her cellphone and after 
getting up to the bedside commode. No new orders from MD at this time. Will educate patient 
on relaxation methods and monitor blood pressure, per MD.

## 2019-12-06 NOTE — NUR
MS/RN Nevarez 



Nevarez catheter removed per patient's request to try and use bedside commode. Will continue 
to monitor. 

-------------------------------------------------------------------------------

Addendum: 12/06/19 at 1435 by YOEL ALMEIDA RN

-------------------------------------------------------------------------------

as ordered, nevarez removed after 24 hrs post-op. Patient will be on trial void. Will continue 
to monitor for any bladder distention and urinary retention.

## 2019-12-06 NOTE — NUR
MS/RN Opening note 



Patient received resting in bed, A/O x3, showing no signs of acute distress or SOB, 
saturating >95% on RA. Iv line on the right wrist #22 is clean and patent running at 75 
ml/hr. S/P L hip ORIF with Dr. Subramanian. Dressing is intact, showing no s/s of bleeding.

Bed is in lowest position, side rails x3 in upright position, call light is within reach and 
patient is aware of how to call for assistance when needed. Safety and fall precautions 
enforced. Will continue with plan of care.

## 2019-12-06 NOTE — NUR
pt refused lab draw. 



scheduled troponin. patient refused lab draw per . spoke with patient states 
"I don't want to have the labs. my arm just cant take it anymore." infomred that the lab 
will be added to am labs. pt states "well we will have to just see how my arm feels later in 
the am."

## 2019-12-06 NOTE — NUR
pt requesting colace. spoke with kirsten blackwood new order for colace 100 mg bid po given with 
dose ordered for now. patient hasnt had bm since last monday per her account. `

## 2019-12-06 NOTE — NUR
MS/RN troponin



Notified Dr. Marquez troponin is 0.216. Ordered routine Troponin lab x2. No further orders 
at this time.

## 2019-12-06 NOTE — NUR
PT ASLEEP AND EASILY AWAKEN, MONITORED FOR PAIN NO C/O OF PAIN AT THIS TIME.S/P L HIP ORIF 
DRESSING INTACT NO S/S OF BLEEDING NOTED. NEEDS ATTENDED AND ANTICIPATED. KEPT CLEAN, DRY 
AND COMFORTABLE. AM CARE RENDERED. ASSISTED REPOSITION Q2HR. OFFLOAD HEELS AND ELBOWS AT ALL 
TIMES. WBAT. SAFETY MEASURES AT ALL TIMES. WILL ENDORSE TO NEXT SHIFT.

## 2019-12-06 NOTE — NUR
MS/RN Elevated BP 



Notified Dr. Marquez of /68 CT 87. Hydralazine 25mg PRN Q6 hours for BP greater than 
160/90 ordered. Repeated order back to MD and will carry out the order.

## 2019-12-07 VITALS — DIASTOLIC BLOOD PRESSURE: 63 MMHG | SYSTOLIC BLOOD PRESSURE: 143 MMHG

## 2019-12-07 VITALS — SYSTOLIC BLOOD PRESSURE: 174 MMHG | DIASTOLIC BLOOD PRESSURE: 72 MMHG

## 2019-12-07 VITALS — DIASTOLIC BLOOD PRESSURE: 66 MMHG | SYSTOLIC BLOOD PRESSURE: 144 MMHG

## 2019-12-07 VITALS — SYSTOLIC BLOOD PRESSURE: 144 MMHG | DIASTOLIC BLOOD PRESSURE: 66 MMHG

## 2019-12-07 VITALS — SYSTOLIC BLOOD PRESSURE: 147 MMHG | DIASTOLIC BLOOD PRESSURE: 65 MMHG

## 2019-12-07 VITALS — DIASTOLIC BLOOD PRESSURE: 59 MMHG | SYSTOLIC BLOOD PRESSURE: 146 MMHG

## 2019-12-07 VITALS — SYSTOLIC BLOOD PRESSURE: 128 MMHG | DIASTOLIC BLOOD PRESSURE: 65 MMHG

## 2019-12-07 LAB
BASOPHILS # BLD AUTO: 0 /CMM (ref 0–0.2)
BASOPHILS NFR BLD AUTO: 0.1 % (ref 0–2)
BUN SERPL-MCNC: 27 MG/DL (ref 7–18)
CALCIUM SERPL-MCNC: 8.4 MG/DL (ref 8.5–10.1)
CHLORIDE SERPL-SCNC: 112 MMOL/L (ref 98–107)
CO2 SERPL-SCNC: 20 MMOL/L (ref 21–32)
CREAT SERPL-MCNC: 1.2 MG/DL (ref 0.6–1.3)
EOSINOPHIL NFR BLD AUTO: 0.1 % (ref 0–6)
EOSINOPHIL NFR BLD MANUAL: 1 % (ref 0–4)
GLUCOSE SERPL-MCNC: 99 MG/DL (ref 74–106)
HCT VFR BLD AUTO: 20 % (ref 33–45)
HCT VFR BLD AUTO: 21 % (ref 33–45)
HGB BLD-MCNC: 6.4 G/DL (ref 11.5–14.8)
HGB BLD-MCNC: 6.5 G/DL (ref 11.5–14.8)
LYMPHOCYTES NFR BLD AUTO: 1 /CMM (ref 0.8–4.8)
LYMPHOCYTES NFR BLD AUTO: 8.9 % (ref 20–44)
LYMPHOCYTES NFR BLD MANUAL: 3 % (ref 16–48)
MCHC RBC AUTO-ENTMCNC: 33 G/DL (ref 31–36)
MCV RBC AUTO: 80 FL (ref 82–100)
MONOCYTES NFR BLD AUTO: 1.1 /CMM (ref 0.1–1.3)
MONOCYTES NFR BLD AUTO: 10.1 % (ref 2–12)
MONOCYTES NFR BLD MANUAL: 5 % (ref 0–11)
NEUTROPHILS # BLD AUTO: 8.8 /CMM (ref 1.8–8.9)
NEUTROPHILS NFR BLD AUTO: 80.8 % (ref 43–81)
NEUTS SEG NFR BLD MANUAL: 91 % (ref 42–76)
PLATELET # BLD AUTO: 210 /CMM (ref 150–450)
POTASSIUM SERPL-SCNC: 4.6 MMOL/L (ref 3.5–5.1)
RBC # BLD AUTO: 2.44 MIL/UL (ref 4–5.2)
SODIUM SERPL-SCNC: 147 MMOL/L (ref 136–145)
WBC NRBC COR # BLD AUTO: 10.9 K/UL (ref 4.3–11)

## 2019-12-07 PROCEDURE — 05HB33Z INSERTION OF INFUSION DEVICE INTO RIGHT BASILIC VEIN, PERCUTANEOUS APPROACH: ICD-10-PCS | Performed by: NURSE PRACTITIONER

## 2019-12-07 RX ADMIN — Medication SCH MG: at 17:00

## 2019-12-07 RX ADMIN — Medication PRN MG: at 09:35

## 2019-12-07 RX ADMIN — ATORVASTATIN CALCIUM SCH MG: 10 TABLET, FILM COATED ORAL at 09:00

## 2019-12-07 RX ADMIN — Medication SCH MG: at 09:33

## 2019-12-07 RX ADMIN — Medication PRN MG: at 14:59

## 2019-12-07 RX ADMIN — ACETAMINOPHEN PRN MG: 325 TABLET ORAL at 15:31

## 2019-12-07 RX ADMIN — SODIUM CHLORIDE SCH MLS/HR: 9 INJECTION, SOLUTION INTRAVENOUS at 18:57

## 2019-12-07 NOTE — NUR
RN NOTES/ASSESSMENT:

RECEIVED REPORT FROM CATIE DAVALOS. PT RESTING, APEARS CALM AND COMFORTABLE, NO FACIAL GRIMACE 
NOTED. S/P LEFT HIP ORIF BY DR PEOPLES , DRESSING C/D/I, NO ACTIVE BLEEDING NOTED. NEW 
MIDLINE INSERTED BY DR BLACKWOOD. S/P 1UNITS PRBC TODAY FOR H/H 6.5 AND 21. PATENT AND FLUSHING 
WELL, ON HL. OFFERED PAIN MEDICATION BUT REFUSED, STATED SHE'S OKAY AT THIS TIME, AND WILL 
CALL IF PAIN MEDS IS NEEDED. PT WORKED WITH PT TODAY.  DISCUSSED PLAN OF CARE TO PT. SAFETY 
PRECAUTIONS FOR FALL INITIATED, CALL LIGHT IN REACH, WILL CONTINUE MONITORING PT.

## 2019-12-07 NOTE — NUR
RN NOTES/TEMP:

RECHECK TEMP 99.0. PRN TYLENOL WAS ADMINISTERED AT 1531. CONTINUE WITH COOLING MEASURES.

## 2019-12-07 NOTE — NUR
MS RN CLOSING NOTE



PATIENT IN BED RESTING COMFORTABLY. PATIENT IN NO ACUTE DISTRESS. NO SOB NOTED. PATIENT 
BREATHING IS EVEN AND UNLABORED. PATIENT NEEDS AND CONCERNS ADDRESSED. HOB IS ELEVATED. BED 
ALARM IS ON. PATIENT KEPT CLEAN, DRY AND COMFORTABLE THROUGHOUT SHIFT. SURGICAL DRESSING DRY 
AND INTACT. PATIENT EXTREMITIES OFFLOADED ON PILLOWS. PATIENT MIDLINE INTACT AND PATENT. 
PATIENT BED IS LOCKED AND IN LOWEST POSITION. CALL LIGHT WITHIN REACH. WILL ENDORSE CARE TO 
PM SHIFT FOR STEVENSON.

## 2019-12-07 NOTE — NUR
MS RN NOTE



PATIENT TOLERATED BLOOD TRANSFUSION. VITAL SIGNS WNL. NO ADVERSE REACTIONS. PATIENT IN NO 
ACUTE DISTRESS. NO SOB NOTED. PATIENT BREATHING IS EVEN AND UNLABORED. WILL CONTINUE TO 
MONITOR.

## 2019-12-07 NOTE — NUR
MS DAVALOS NOTE



PATIENT TOLERATED MIDLINE WELL. PATIENT IN NO DISTRESS. MIDLINE PATENT AND INTACT. 

-------------------------------------------------------------------------------

Addendum: 12/07/19 at 1947 by YOEL ALMEIDA RN

-------------------------------------------------------------------------------

AT 1341.

## 2019-12-07 NOTE — NUR
MS/RN Critical lab 



Received call from lab, spoke with Thea. Stated patient's Hemoglobin 6.4, hematocrit 20. 
Informed Dr. Marquez of the lab results and he ordered another set of Hemoglobin and 
Hematocrit labs at 1000. No further orders at this time. Will continue to monitor.

## 2019-12-07 NOTE — NUR
MS RN NOTES-- RECEIVED VERBAL ORDER FROM DR. VÁZQUEZ FOR MIDLINE INSERTION FOR MULTIPLE PIV 
ATTEMPS. ORDERS READ BACK AND VERIFIED. NOTIFIED SUPERVISOR MARVIN.

## 2019-12-07 NOTE — NUR
MS/RN critical lab value



Received a call from lab, spoke with Thea. Critical lab value of Hgb 6.5. Paged Dr. Marquez

## 2019-12-07 NOTE — NUR
MS/RN -------------- received a call from lab that they have blood ready since 12/5/19 and 
that its ok to give today.

## 2019-12-07 NOTE — NUR
MS/RN blood transfusion



Patient showing no signs of acute distress or SOB, patient reports no s/sx of transfusion 
reaction. Will continue to monitor.

## 2019-12-07 NOTE — NUR
RN NOTES/COOLING MEASURES:

COOLING MEASURES PROVIDED, REMOVED EXCESS BLANKET, TEMP 99.9. WILL RECHECK IN AN HOUR.

## 2019-12-07 NOTE — NUR
MS/RN Blood transfusion



Blood transfusion started. I am currently at the bedside, will monitor patient closely.

## 2019-12-07 NOTE — NUR
RN NOTES:

RESTING, APPEARS CALM AND COMFORTABLE, NO FACIAL GRIMACE NOTED. 

-------------------------------------------------------------------------------

Addendum: 12/08/19 at 0647 by BOBBI PELAYO RN

-------------------------------------------------------------------------------

DISREGARD ABOVE DOCUMENTATION: WRONG ENTRY

## 2019-12-08 VITALS — SYSTOLIC BLOOD PRESSURE: 151 MMHG | DIASTOLIC BLOOD PRESSURE: 76 MMHG

## 2019-12-08 VITALS — DIASTOLIC BLOOD PRESSURE: 76 MMHG | SYSTOLIC BLOOD PRESSURE: 179 MMHG

## 2019-12-08 VITALS — SYSTOLIC BLOOD PRESSURE: 160 MMHG | DIASTOLIC BLOOD PRESSURE: 75 MMHG

## 2019-12-08 LAB
ALBUMIN SERPL BCP-MCNC: 2.3 G/DL (ref 3.4–5)
ALP SERPL-CCNC: 38 U/L (ref 46–116)
ALT SERPL W P-5'-P-CCNC: 24 U/L (ref 12–78)
AST SERPL W P-5'-P-CCNC: 19 U/L (ref 15–37)
BASOPHILS # BLD AUTO: 0 /CMM (ref 0–0.2)
BASOPHILS NFR BLD AUTO: 0.2 % (ref 0–2)
BILIRUB SERPL-MCNC: 0.4 MG/DL (ref 0.2–1)
BUN SERPL-MCNC: 28 MG/DL (ref 7–18)
CALCIUM SERPL-MCNC: 8.3 MG/DL (ref 8.5–10.1)
CHLORIDE SERPL-SCNC: 110 MMOL/L (ref 98–107)
CO2 SERPL-SCNC: 24 MMOL/L (ref 21–32)
CREAT SERPL-MCNC: 1 MG/DL (ref 0.6–1.3)
EOSINOPHIL NFR BLD AUTO: 2.4 % (ref 0–6)
GLUCOSE SERPL-MCNC: 83 MG/DL (ref 74–106)
HCT VFR BLD AUTO: 23 % (ref 33–45)
HGB BLD-MCNC: 7.5 G/DL (ref 11.5–14.8)
LYMPHOCYTES NFR BLD AUTO: 1.9 /CMM (ref 0.8–4.8)
LYMPHOCYTES NFR BLD AUTO: 17.5 % (ref 20–44)
MAGNESIUM SERPL-MCNC: 2.2 MG/DL (ref 1.8–2.4)
MCHC RBC AUTO-ENTMCNC: 33 G/DL (ref 31–36)
MCV RBC AUTO: 83 FL (ref 82–100)
MONOCYTES NFR BLD AUTO: 1 /CMM (ref 0.1–1.3)
MONOCYTES NFR BLD AUTO: 9.2 % (ref 2–12)
NEUTROPHILS # BLD AUTO: 7.7 /CMM (ref 1.8–8.9)
NEUTROPHILS NFR BLD AUTO: 70.7 % (ref 43–81)
PHOSPHATE SERPL-MCNC: 3.2 MG/DL (ref 2.5–4.9)
PLATELET # BLD AUTO: 240 /CMM (ref 150–450)
POTASSIUM SERPL-SCNC: 3.9 MMOL/L (ref 3.5–5.1)
PROT SERPL-MCNC: 5.8 G/DL (ref 6.4–8.2)
RBC # BLD AUTO: 2.8 MIL/UL (ref 4–5.2)
SODIUM SERPL-SCNC: 144 MMOL/L (ref 136–145)
WBC NRBC COR # BLD AUTO: 10.9 K/UL (ref 4.3–11)

## 2019-12-08 RX ADMIN — ATORVASTATIN CALCIUM SCH MG: 10 TABLET, FILM COATED ORAL at 08:43

## 2019-12-08 RX ADMIN — Medication PRN MG: at 08:42

## 2019-12-08 RX ADMIN — Medication SCH MG: at 08:43

## 2019-12-08 RX ADMIN — Medication PRN EACH: at 10:20

## 2019-12-08 RX ADMIN — SODIUM CHLORIDE SCH MLS/HR: 9 INJECTION, SOLUTION INTRAVENOUS at 17:05

## 2019-12-08 RX ADMIN — Medication SCH MG: at 17:05

## 2019-12-08 NOTE — NUR
END OF SHIFT REPORT:

PT RESTING, REMAINS AFEBRILE. RIGHT BASILIC MIDLINE REMAINS PATENT AND FLUSHIGN WELL, ON HL, 
NO S/S OF IV INFILTRATION NOTED. DRESSING ON LEFT HIP REMAINS C/D/I, NO ACTIVE BLEEDING 
NOTED. BLE KEPT OFFLOADED ON PILLOWS. POSSIBLE DC TO [facility] TODAY. EXIT CARE FILLED 
OUT. VS REMAINS STABLE, NEEDS ATTENDED. SAFETY PRECAUTIONS FOR FALL REMAINS ENGAGED, CALL 
LIGHT IN REACH, WILL ENDORSE TO DAY RN FOR CONTINUITY OF CARE.

## 2019-12-08 NOTE — NUR
MS RN OPENING NOTES



RECEIVED PATIENT IN BED. ALERT AND AWAKE ORIENTED X3. NO S/S OF RESPIRATORY DISTRESS. DENIES 
ANY C/O PAIN NOR DISCOMFORT AT THIS TIME. ASSISTED TO BED SIDE COMMODE. JHONNY MIDLINE INTACT 
AND PATENT. LEFT HIP SX INCISION DRESSING INTACT. BED IN LOWEST POSITION, LOCKED. CALL LIGHT 
WITHIN REACH. ABLE TO VERBALIZE NEEDS.

## 2019-12-08 NOTE — NUR
MS RN OPENING NOTES



Received patient A/O x4, awake on bed. Denies discomfort at this time. On RA, no 
SOB/respiratory distress noted. On fall and aspiration precautions. Kept on bed clean, dry 
and comfortable. Call light within easy reach. Will continue to monitor accordingly.

## 2019-12-08 NOTE — NUR
MS RN NOTES



INFORMED PATIENT THAT LAST CT HEART ANGIO WAS DONE IN 2017 AT [facility] AND CHEST 
CT WITHOUT CONTRAST WAS DONE RECENTLY. PER PATIENT, SHE WOULD RATHER NOT DO THE CT HEART 3D 
IMAGE AND PREFERS TO GO TO REHAB [facility] AT [facility] AND FOLLOW UP WITH HER OWN 
CARDIOLOGIST.

## 2019-12-09 VITALS — SYSTOLIC BLOOD PRESSURE: 178 MMHG | DIASTOLIC BLOOD PRESSURE: 64 MMHG

## 2019-12-09 VITALS — SYSTOLIC BLOOD PRESSURE: 163 MMHG | DIASTOLIC BLOOD PRESSURE: 75 MMHG

## 2019-12-09 LAB
BASOPHILS # BLD AUTO: 0.1 /CMM (ref 0–0.2)
BASOPHILS NFR BLD AUTO: 0.5 % (ref 0–2)
BUN SERPL-MCNC: 21 MG/DL (ref 7–18)
CALCIUM SERPL-MCNC: 8.2 MG/DL (ref 8.5–10.1)
CHLORIDE SERPL-SCNC: 108 MMOL/L (ref 98–107)
CO2 SERPL-SCNC: 24 MMOL/L (ref 21–32)
CREAT SERPL-MCNC: 1 MG/DL (ref 0.6–1.3)
EOSINOPHIL NFR BLD AUTO: 2.7 % (ref 0–6)
GLUCOSE SERPL-MCNC: 85 MG/DL (ref 74–106)
HCT VFR BLD AUTO: 26 % (ref 33–45)
HGB BLD-MCNC: 8.4 G/DL (ref 11.5–14.8)
LYMPHOCYTES NFR BLD AUTO: 1.5 /CMM (ref 0.8–4.8)
LYMPHOCYTES NFR BLD AUTO: 14.9 % (ref 20–44)
MCHC RBC AUTO-ENTMCNC: 32 G/DL (ref 31–36)
MCV RBC AUTO: 82 FL (ref 82–100)
MONOCYTES NFR BLD AUTO: 1 /CMM (ref 0.1–1.3)
MONOCYTES NFR BLD AUTO: 9.5 % (ref 2–12)
NEUTROPHILS # BLD AUTO: 7.5 /CMM (ref 1.8–8.9)
NEUTROPHILS NFR BLD AUTO: 72.4 % (ref 43–81)
PLATELET # BLD AUTO: 243 /CMM (ref 150–450)
POTASSIUM SERPL-SCNC: 3.7 MMOL/L (ref 3.5–5.1)
RBC # BLD AUTO: 3.13 MIL/UL (ref 4–5.2)
SODIUM SERPL-SCNC: 141 MMOL/L (ref 136–145)
WBC NRBC COR # BLD AUTO: 10.4 K/UL (ref 4.3–11)

## 2019-12-09 RX ADMIN — Medication SCH MG: at 17:24

## 2019-12-09 RX ADMIN — SODIUM CHLORIDE SCH MLS/HR: 9 INJECTION, SOLUTION INTRAVENOUS at 14:00

## 2019-12-09 RX ADMIN — ATORVASTATIN CALCIUM SCH MG: 10 TABLET, FILM COATED ORAL at 08:23

## 2019-12-09 RX ADMIN — Medication PRN MG: at 09:57

## 2019-12-09 RX ADMIN — Medication SCH MG: at 08:23

## 2019-12-09 NOTE — NUR
MS RN NOTE:



PATIENT RESTING IN BED, NO ACUTE DISTRESS NOTED. BREATHING EVEN AND UNLABORED, NO SOB NOTED. 
MIDLINE TO JHONNY IN PLACE. BED LOCKED AND IN LOWEST POSITION, CALL LIGHT IN REACH, WILL 
CONTINUE TO MONITOR.

## 2019-12-09 NOTE — NUR
MS RN CLOSING NOTES



Patient intermittently asleep, on RA, no SOB/respiratory distress noted. Able to use BSC, 
tolerated well. No new complaints made within the shift. Afebrile. All nursing needs 
attended. On fall and aspiration precautions. Kept on bed clean, dry and comfortable. Call 
light within easy reach. Endorsed to the next shift.

## 2019-12-09 NOTE — NUR
Patient alert and oriented this shift, complains of constipation, PRN MOM given, not yet 
effective. Patient assisted with all needs, all MD orders and med orders given and carried 
out. She continues to refuse angiogram reporting "that she does not need any more test and I 
have everything krunal control"

## 2019-12-10 VITALS — DIASTOLIC BLOOD PRESSURE: 76 MMHG | SYSTOLIC BLOOD PRESSURE: 159 MMHG

## 2019-12-10 VITALS — DIASTOLIC BLOOD PRESSURE: 85 MMHG | SYSTOLIC BLOOD PRESSURE: 167 MMHG

## 2019-12-10 RX ADMIN — Medication SCH MG: at 08:12

## 2019-12-10 RX ADMIN — ATORVASTATIN CALCIUM SCH MG: 10 TABLET, FILM COATED ORAL at 08:12

## 2019-12-10 RX ADMIN — Medication SCH MG: at 17:03

## 2019-12-10 NOTE — NUR
m/s lvn: notes



pt up in bsc and trying to go, but couldn't. self impacted done by pt and still unable to 
go. lactulose given, but refuse. med discarded. pt wants an enema. place a call to dr. dorsey.

## 2019-12-10 NOTE — NUR
m/s lvn: notes



discharge instructions given to pt and verbalized understanding. all belongings/valuables 
returned to pt.

## 2019-12-10 NOTE — NUR
MS RN NOTE:



PATIENT RESTING IN BED, NO ACUTE DISTRESS NOTED. BREATHING EVEN AND UNLABORED, NO SOB NOTED. 
MIDLINE TO JHONNY IN PLACE. BED LOCKED AND IN LOWEST POSITION, CALL LIGHT IN REACH, WILL 
ENDORSE TO DAY NURSE TO CONTINUE WITH PLAN OF CARE.

## 2019-12-10 NOTE — NUR
m/s lvn: initial assessment



received pt in bed awake, a/ox4. no c/o pain at this time. pt still complaining 
constipation. pt noted with anxiousness and needy. no distress noted. instructed to call for 
assistance.

## 2019-12-10 NOTE — NUR
m/s lvn: notes



pt made aware re: discharge to [facility] today and discharge skin photo taken and placed in chart. 
noted fading bruise/skin discoloration to left side of face and neck. pt already called 
daughter and no need for me to call her as stated. instructed to call for assistance. will 
monitor.

## 2019-12-10 NOTE — NUR
m/s lvn: notes



report given to margie hurtado) from [facility] for continuity of care. pt is on the phone 
with daughter.

## 2019-12-10 NOTE — NUR
m/s lvn: notes



pt still complaining and wants staff to do manual disimpaction. dr. dorsey notified and 
made aware and okay to manual disimpact pt. order read back. manual disimpaction performed, 
kvng. well. kept clean and dry. good pericare rendered. will continue to monitor.

## 2019-12-10 NOTE — NUR
MS RN NOTE:



PATIENT SLEEPING IN BED, NO ACUTE DISTRESS NOTED. BREATHING EVEN AND UNLABORED, NO SOB 
NOTED. MIDLINE TO JHONNY IN PLACE. BED LOCKED AND IN LOWEST POSITION, CALL LIGHT IN REACH, WILL 
CONTINUE TO MONITOR.

## 2019-12-10 NOTE — NUR
m/s lvn: notes



midline removed as ordered with no bleeding noted. pressure dressing applied to site.

## 2019-12-10 NOTE — NUR
m/s lvn: notes



pt was not ready, wants to eat and use the bsc. ambulance waiting outside the room.

## 2019-12-10 NOTE — NUR
m/s lvn: discharged



discharged to [facility] via ambulance accompanied by 2 crew in stable condition with valuables.

## 2019-12-10 NOTE — NUR
m/s lvn: notes



f/u made to ambulance spoke to jg and apologize for being late, eta in 20 minutes. pt made 
aware. needs attended. will continue to monitor.

## 2020-10-28 ENCOUNTER — OFFICE (OUTPATIENT)
Dept: URBAN - METROPOLITAN AREA CLINIC 66 | Facility: CLINIC | Age: 80
End: 2020-10-28

## 2020-10-28 VITALS
SYSTOLIC BLOOD PRESSURE: 156 MMHG | DIASTOLIC BLOOD PRESSURE: 92 MMHG | HEIGHT: 62 IN | TEMPERATURE: 98.3 F | WEIGHT: 128 LBS

## 2020-10-28 DIAGNOSIS — K51.90 ULCERATIVE COLITIS: ICD-10-CM

## 2020-10-28 DIAGNOSIS — K21.9 GASTROESOPHAGEAL REFLUX DISEASE: ICD-10-CM

## 2020-10-28 PROCEDURE — 99213 OFFICE O/P EST LOW 20 MIN: CPT | Performed by: INTERNAL MEDICINE

## 2020-10-28 NOTE — SERVICEHPINOTES
The patient is a pleasant 79 yo female referred for evaluation of GERD, now with occasional bouts of vomiting, and ulcerative colitis. The patient has been on Nexium for quite some time, but has been on this just in the morning, and will now go to twice daily. Her 5/2019 EGD found a large, 6 cm HH, a very tortuous "[address]" esophagus, and no Hp nor celiac. She's on Lialda 4.8 grams a day, and her 2007 colonoscopy found left sided mildly active disease, without any footprints of quiescent colitis on the right side. FONT style="BACKGROUND-COLOR: #ffffff" visited="true" We discussed the utility of evaluation for an enlarging hiatal hernia, paraesophageal hernia, or other diaphragmatic defects with CT scanning./FONT    Three years ago her weight was 130 pounds, went up to 140, and is back to 128.

## 2021-10-04 ENCOUNTER — OFFICE (OUTPATIENT)
Dept: URBAN - METROPOLITAN AREA CLINIC 66 | Facility: CLINIC | Age: 81
End: 2021-10-04

## 2021-10-04 VITALS
DIASTOLIC BLOOD PRESSURE: 71 MMHG | HEIGHT: 62 IN | WEIGHT: 137 LBS | TEMPERATURE: 98.2 F | SYSTOLIC BLOOD PRESSURE: 175 MMHG

## 2021-10-04 DIAGNOSIS — K21.9 GASTROESOPHAGEAL REFLUX DISEASE: ICD-10-CM

## 2021-10-04 PROCEDURE — 99214 OFFICE O/P EST MOD 30 MIN: CPT | Performed by: INTERNAL MEDICINE

## 2021-10-04 NOTE — SERVICEHPINOTES
The patient is a pleasant 82 yo female referred for evaluation of GERD, now with occasional bouts of vomiting, and ulcerative colitis. The patient has been on Nexium for quite some time, but has been on this just in the morning, and will now go to twice daily. Her 5/2019 EGD found a large, 6 cm HH, a very tortuous "[address]" esophagus, and no Hp nor celiac. She's on Lialda 4.8 grams a day, and her 2007 colonoscopy found left sided mildly active disease, without any footprints of quiescent colitis on the right side. We discussed the utility of evaluation for an enlarging hiatal hernia, paraesophageal hernia, or other diaphragmatic defects with CT scanning. Three years ago her weight was 130 pounds, went up to 140, and is back to 137. For Gas, FODMAPs handouts given for exercise induced  GERD--Gaviscon for early am urgency--1/2 Imodium at bedtime.

## 2021-11-16 ENCOUNTER — OFFICE (OUTPATIENT)
Dept: URBAN - METROPOLITAN AREA CLINIC 66 | Facility: CLINIC | Age: 81
End: 2021-11-16

## 2021-11-16 VITALS
HEIGHT: 62 IN | SYSTOLIC BLOOD PRESSURE: 163 MMHG | TEMPERATURE: 98.4 F | DIASTOLIC BLOOD PRESSURE: 93 MMHG | WEIGHT: 137 LBS

## 2021-11-16 DIAGNOSIS — K21.9 GASTROESOPHAGEAL REFLUX DISEASE: ICD-10-CM

## 2021-11-16 DIAGNOSIS — K51.50 LEFT SIDED COLITIS W/O COMPLICATIONS: ICD-10-CM

## 2021-11-16 PROCEDURE — 99214 OFFICE O/P EST MOD 30 MIN: CPT | Performed by: INTERNAL MEDICINE

## 2021-11-16 NOTE — SERVICEHPINOTES
Lena is her with her caregiver, Daiana, and after 10 days of Prednisone 40 mg/d, her symptoms are much improved except for fatigue.  We'll decrease by 10 mg every 10 days,  The patient will need a colonoscopy for surveillance of her ulcerative colitis in 1/2022--her last was in 2007. The patient has been on Nexium for quite some time, but has been on this just in the morning, and will now go to twice daily. Her 5/2019 EGD found a large, 6 cm HH, a very tortuous "[address]" esophagus, and no Hp nor celiac. She's on Lialda 4.8 grams a day, and her 2007 colonoscopy found left sided mildly active disease, without any footprints of quiescent colitis on the right side. Three years ago her weight was 130 pounds, went up to 140, and is back to 137. For Gas, FODMAPs handouts given for exercise induced GERD--Gaviscon for early am urgency--1/2 Imodium at bedtime